# Patient Record
Sex: MALE | Race: OTHER
[De-identification: names, ages, dates, MRNs, and addresses within clinical notes are randomized per-mention and may not be internally consistent; named-entity substitution may affect disease eponyms.]

---

## 2018-02-06 ENCOUNTER — HOSPITAL ENCOUNTER (EMERGENCY)
Dept: HOSPITAL 72 - EMR | Age: 83
Discharge: LEFT BEFORE DISCHARGE | End: 2018-02-06
Payer: MEDICARE

## 2018-02-06 VITALS — DIASTOLIC BLOOD PRESSURE: 65 MMHG | SYSTOLIC BLOOD PRESSURE: 122 MMHG

## 2018-02-06 VITALS — WEIGHT: 170 LBS | BODY MASS INDEX: 25.76 KG/M2 | HEIGHT: 68 IN

## 2018-02-06 VITALS — DIASTOLIC BLOOD PRESSURE: 77 MMHG | SYSTOLIC BLOOD PRESSURE: 119 MMHG

## 2018-02-06 DIAGNOSIS — I25.10: ICD-10-CM

## 2018-02-06 DIAGNOSIS — R13.10: ICD-10-CM

## 2018-02-06 DIAGNOSIS — I10: ICD-10-CM

## 2018-02-06 DIAGNOSIS — E78.00: ICD-10-CM

## 2018-02-06 DIAGNOSIS — K21.9: ICD-10-CM

## 2018-02-06 DIAGNOSIS — R07.9: Primary | ICD-10-CM

## 2018-02-06 LAB
ADD MANUAL DIFF: NO
ALBUMIN SERPL-MCNC: 3.3 G/DL (ref 3.4–5)
ALBUMIN/GLOB SERPL: 0.9 {RATIO} (ref 1–2.7)
ALP SERPL-CCNC: 118 U/L (ref 46–116)
ALT SERPL-CCNC: 52 U/L (ref 12–78)
ANION GAP SERPL CALC-SCNC: 7 MMOL/L (ref 5–15)
APTT BLD: 28 SEC (ref 23–33)
AST SERPL-CCNC: 21 U/L (ref 15–37)
BASOPHILS NFR BLD AUTO: 0.8 % (ref 0–2)
BILIRUB SERPL-MCNC: 0.9 MG/DL (ref 0.2–1)
BUN SERPL-MCNC: 21 MG/DL (ref 7–18)
CALCIUM SERPL-MCNC: 9.3 MG/DL (ref 8.5–10.1)
CHLORIDE SERPL-SCNC: 104 MMOL/L (ref 98–107)
CK MB SERPL-MCNC: 1.1 NG/ML (ref 0–3.6)
CK SERPL-CCNC: 49 U/L (ref 26–308)
CO2 SERPL-SCNC: 31 MMOL/L (ref 21–32)
CREAT SERPL-MCNC: 1 MG/DL (ref 0.55–1.3)
EOSINOPHIL NFR BLD AUTO: 2.3 % (ref 0–3)
ERYTHROCYTE [DISTWIDTH] IN BLOOD BY AUTOMATED COUNT: 12.7 % (ref 11.6–14.8)
GLOBULIN SER-MCNC: 3.7 G/DL
HCT VFR BLD CALC: 46.1 % (ref 42–52)
HGB BLD-MCNC: 15.7 G/DL (ref 14.2–18)
INR PPP: 1 (ref 0.9–1.1)
LYMPHOCYTES NFR BLD AUTO: 30.9 % (ref 20–45)
MCV RBC AUTO: 90 FL (ref 80–99)
MONOCYTES NFR BLD AUTO: 10.3 % (ref 1–10)
NEUTROPHILS NFR BLD AUTO: 55.7 % (ref 45–75)
PLATELET # BLD: 184 K/UL (ref 150–450)
POTASSIUM SERPL-SCNC: 4.3 MMOL/L (ref 3.5–5.1)
RBC # BLD AUTO: 5.14 M/UL (ref 4.7–6.1)
SODIUM SERPL-SCNC: 142 MMOL/L (ref 136–145)
WBC # BLD AUTO: 5.9 K/UL (ref 4.8–10.8)

## 2018-02-06 PROCEDURE — 36415 COLL VENOUS BLD VENIPUNCTURE: CPT

## 2018-02-06 PROCEDURE — 85730 THROMBOPLASTIN TIME PARTIAL: CPT

## 2018-02-06 PROCEDURE — 84484 ASSAY OF TROPONIN QUANT: CPT

## 2018-02-06 PROCEDURE — 71045 X-RAY EXAM CHEST 1 VIEW: CPT

## 2018-02-06 PROCEDURE — 82550 ASSAY OF CK (CPK): CPT

## 2018-02-06 PROCEDURE — 85610 PROTHROMBIN TIME: CPT

## 2018-02-06 PROCEDURE — 93005 ELECTROCARDIOGRAM TRACING: CPT

## 2018-02-06 PROCEDURE — 99285 EMERGENCY DEPT VISIT HI MDM: CPT

## 2018-02-06 PROCEDURE — 82553 CREATINE MB FRACTION: CPT

## 2018-02-06 PROCEDURE — 80053 COMPREHEN METABOLIC PANEL: CPT

## 2018-02-06 PROCEDURE — 83880 ASSAY OF NATRIURETIC PEPTIDE: CPT

## 2018-02-06 PROCEDURE — 85025 COMPLETE CBC W/AUTO DIFF WBC: CPT

## 2018-02-06 NOTE — DISCHARGE SUMMARY
Discharge Summary


Hospital Course


Date of Admission


Feb 6, 2018 at 14:45


Date of Discharge





Admitting Diagnosis


DYSPHAGIA, CP


HPI


Federico Senior is a 85 year old male who was admitted on Feb 6, 2018 at 14:

45 for Dysphagia,Chest Pain


Hospital Course


Patient came in to ED for complaints of Chest pain and vomiting x1. He has PMH 

significant


for CAD, HTN, HLD, choledocholithiasis s/p ERCP sept 2017 at [facility].  He 

presented with 


one day duration of chest pain, no radiation, non-exertional pain, initial work-

up at ED showed


negative troponin and EKG sinus with no changes.  He was planned for admission 

to telemetry


and will undergo cardiac work-up with cardiology. Orders were given and patient


was examined at ED, however, while waiting to be transferred, daughter came to 


ED and patient left AMA.





Final Diagnosis:


1. Chest pain, possible ACS, incomplete work-up done 


2. Gastritis/GERD


3. Hyperlipidemia


4. Hypertension


5. Vomiting





Above findings and plans were discussed with Dr Mancini.





Discharge


Discharge Disposition


Patient left AMA


Discharge Diagnoses:  











Yany Huddleston NP Feb 6, 2018 18:14

## 2018-02-06 NOTE — EMERGENCY ROOM REPORT
History of Present Illness


General


Chief Complaint:  Chest Pain


Source:  Patient, Medical Record





Present Illness


HPI


This patient is brought in by EMS.  He states that he was eating an orange 

today and was having difficulty swallowing it and it became stuck.  He states 

that then he vomiting and developed cp.  He states that he has had other 

similar incidents with pills.  He denies shortness of breath.  He denies cough 

or congestion.  He denies fever or chills.  He denies recent illness.  He 

states that his chest pain has now resolved.  He has no other complaints.


Allergies:  


Coded Allergies:  


     No Known Allergies (Unverified , 2/6/18)





Patient History


Past Medical History:  see triage record, HTN, MI, CAD, other - HLP


Social History:  Denies: smoking, alcohol use, drug use


Reviewed Nursing Documentation:  PMH: Agreed, PSxH: Agreed





Nursing Documentation-PMH


Hx Cardiac Problems:  Yes - CAD; High Cholesterol


Hx Hypertension:  Yes


Hx Gastrointestinal Problems:  Yes - GERD





Review of Systems


All Other Systems:  negative except mentioned in HPI





Physical Exam





Vital Signs








  Date Time  Temp Pulse Resp B/P (MAP) Pulse Ox O2 Delivery O2 Flow Rate FiO2


 


2/6/18 13:05 95.5 58 16 104/69 99 Room Air  








Sp02 EP Interpretation:  reviewed, normal


General Appearance:  no apparent distress, alert, GCS 15, non-toxic


Head:  normocephalic, atraumatic


Eyes:  bilateral eye normal inspection, bilateral eye PERRL


ENT:  hearing grossly normal, normal pharynx, no angioedema, normal voice


Neck:  full range of motion, supple/symm/no masses


Respiratory:  chest non-tender, lungs clear, normal breath sounds, speaking 

full sentences


Cardiovascular #1:  regular rate, rhythm, no edema, systolic murmur


Gastrointestinal:  normal bowel sounds, non tender, soft, non-distended, no 

guarding, no rebound


Rectal:  deferred


Musculoskeletal:  back normal, gait/station normal, normal range of motion, non-

tender


Neurologic:  alert, oriented x3, responsive, motor strength/tone normal, 

sensory intact, speech normal


Psychiatric:  judgement/insight normal, memory normal, mood/affect normal, no 

suicidal/homicidal ideation


Skin:  normal color, no rash, warm/dry, well hydrated





Medical Decision Making


Diagnostic Impression:  


 Primary Impression:  


 Chest pain


 Additional Impression:  


 Dysphagia


ER Course


This elderly male presents with chest pain after the dysphasia incident.  This 

could be related to acid reflux and vomiting.  However, the patient has a 

history of coronary artery disease, hypertension and hyperlipidemia in his high-

risk for acute coronary syndrome.  This could have been a stressor that caused 

angina.  Another consideration is esophagitis.  Patient also has had some 

dysphasia previously and will need undergo evaluation by gastroenterology.  He 

could have esophageal cancer other esophageal abnormality.  He was admitted for 

further evaluation by cardiology and gastroenterology.





Laboratory Tests








Test


  2/6/18


14:24


 


White Blood Count


  5.9 K/UL


(4.8-10.8)


 


Red Blood Count


  5.14 M/UL


(4.70-6.10)


 


Hemoglobin


  15.7 G/DL


(14.2-18.0)


 


Hematocrit


  46.1 %


(42.0-52.0)


 


Mean Corpuscular Volume 90 FL (80-99)  


 


Mean Corpuscular Hemoglobin


  30.5 PG


(27.0-31.0)


 


Mean Corpuscular Hemoglobin


Concent 34.0 G/DL


(32.0-36.0)


 


Red Cell Distribution Width


  12.7 %


(11.6-14.8)


 


Platelet Count


  184 K/UL


(150-450)


 


Mean Platelet Volume


  9.7 FL


(6.5-10.1)


 


Neutrophils (%) (Auto)


  55.7 %


(45.0-75.0)


 


Lymphocytes (%) (Auto)


  30.9 %


(20.0-45.0)


 


Monocytes (%) (Auto)


  10.3 %


(1.0-10.0)  H


 


Eosinophils (%) (Auto)


  2.3 %


(0.0-3.0)


 


Basophils (%) (Auto)


  0.8 %


(0.0-2.0)


 


Prothrombin Time


  10.0 SEC


(9.30-11.50)


 


Prothrombin Time INR 1.0 (0.9-1.1)  


 


PTT


  28 SEC (23-33)


 


 


Sodium Level


  142 MMOL/L


(136-145)


 


Potassium Level


  4.3 MMOL/L


(3.5-5.1)


 


Chloride Level


  104 MMOL/L


()


 


Carbon Dioxide Level


  31 MMOL/L


(21-32)


 


Anion Gap


  7 mmol/L


(5-15)


 


Blood Urea Nitrogen


  21 mg/dL


(7-18)  H


 


Creatinine


  1.0 MG/DL


(0.55-1.30)


 


Estimate Glomerular


Filtration Rate  mL/min (>60)  


 


 


Glucose Level


  100 MG/DL


()


 


Calcium Level


  9.3 MG/DL


(8.5-10.1)


 


Total Bilirubin Pending  


 


Aspartate Amino Transferase


(AST) Pending  


 


 


Alanine Aminotransferase (ALT) Pending  


 


Alkaline Phosphatase Pending  


 


Total Creatine Kinase Pending  


 


Creatine Kinase MB Pending  


 


Troponin I


  0.000 ng/mL


(0.000-0.056)


 


Pro-B-Type Natriuretic Peptide Pending  


 


Total Protein Pending  


 


Albumin Pending  


 


Globulin Pending  








EKG Diagnostic Results


Rate:  normal


Rhythm:  NSR


ST Segments:  no acute changes


Other Impression


NSST





Rhythm Strip Diag. Results


EP Interpretation:  yes


Rate:  80's


Rhythm:  NSR, no PVC's, no ectopy





Chest X-Ray Diagnostic Results


Chest X-Ray Diagnostic Results :  


   Chest X-Ray Ordered:  Yes


   # of Views/Limited/Complete:  1 View


   Indication:  Chest Pain


   EP Interpretation:  Yes


   Interpretation:  no consolidation, no effusion, no pneumothorax, no acute 

cardiopulmonary disease


   Impression:  No acute disease


   Electronically Signed by:  Roula





Last Vital Signs








  Date Time  Temp Pulse Resp B/P (MAP) Pulse Ox O2 Delivery O2 Flow Rate FiO2


 


2/6/18 13:23 96.1 58 16 122/65 99 Room Air  








Disposition:  ADMITTED AS INPATIENT


Condition:  Stable


Referrals:  


Kenroy Mancini MD (PCP)











RENE APODACA D.O. Feb 6, 2018 16:09

## 2018-02-06 NOTE — DIAGNOSTIC IMAGING REPORT
Indication: Chest pain

 

Technique: One view of the chest

 

Comparison: none

 

Findings: No acute infiltrates, effusions, or congestion. Tortuous calcified aorta.

Normal heart size. Upper mediastinum unremarkable. There is minimal atelectasis at

the left lung base

 

Impression: No acute process.

## 2018-02-06 NOTE — HISTORY & PHYSICAL
History and Physical


History & Physicial


[id number]











Yany Huddleston NP Feb 6, 2018 17:29

## 2018-02-07 NOTE — CARDIOLOGY REPORT
--------------- APPROVED REPORT --------------





EKG Measurement

Heart [id number]

NM [id number]

[id number]

[id number]

[id number]





Sinus bradycardia with 1st degree AV block

Cannot rule out Anterior infarct, age undetermined

Abnormal ECG

## 2018-02-07 NOTE — HX AND PHYL REPO 2 SIG
DATE OF ADMISSION:  02/06/2018



HISTORY OF PRESENT ILLNESS:  The patient is a delightful 85 year-old [place] 
male, who

was at the adult  [place] complained of chest pain and had vomiting

x1, presented to ED for further evaluation.  He has past medical history

significant for coronary artery disease, high blood pressure,

hyperlipidemia, GERD, and choledocholithiasis status post ERCP done five

months ago at [facility].  According to the patient, he was feeling

fine.  Earlier today, he developed chest discomfort, chest pain with no

radiation.  The patient then presented to ED for further evaluation.  He

also had an episode of vomiting x1, which is nonbilious and nonbloody.

Vomitus was from previously digested food.  He was evaluated at

emergency room, initial troponin was negative and EKG was unremarkable. However
, due

to the patient's risk factors, the patient will be admitted to telemetry for 
workup for

possible acute coronary syndrome.



PAST MEDICAL HISTORY:  Significant for coronary artery disease, GERD,

choledocholithiasis status post ERCP, hyperlipidemia, hypertension,

occasional dysphagia.



MEDICATIONS:  The patient unable to recall the list of home medications.

We will try to secure medication list from the patient's daughter.



ALLERGY:  No known allergy.



CODE STATUS:  Full Code.



SOCIAL HISTORY:  Denies smoking or alcohol use.  The patient lives at

home. 

REVIEW OF SYSTEMS:  Denies fever or chills.  Denies eye pain or blurry

vision.  Denies ear pain or nasal discharge, but has occasional cough.

Denies shortness of breath.  He has constipation and one episode of

vomiting.  Denies dysuria, frequency or urgency.  Denies weakness or

numbness.  Denies easy bleeding or easy bruising.



PHYSICAL EXAMINATION:

CONSTITUTIONAL:  The patient is well-developed and well-nourished male not

in distress.

VITAL SIGNS:  On arrival to ED, blood pressure is 104/69, respiratory rate

16, pulse 58 and 99% oxygen saturation on room air.

HEENT:  Head is normocephalic and atraumatic.

NECK:  Supple.

CARDIOVASCULAR:  S1 and S2.  Regular rate and rhythm.

RESPIRATORY:  Lungs are clear with no use of accessory muscles of

respiration.  No rales.  No crackles.

ABDOMEN:  Slightly distended, but soft.  Not firm.  Positive normoactive

bowel sounds.

GENITOURINARY:  Deferred.

EXTREMITIES:  No clubbing.  No cyanosis with slight bilateral

edema.

NEUROLOGIC:  The patient is awake, alert, and oriented. Cranial nerves II

through XII grossly intact.



LABORATORY AND DIAGNOSTIC DATA:  WBC 5.9, hemoglobin 15, hematocrit 46 and

platelet is 184.  Sodium is 142, potassium 4.3, BUN 21, and creatinine

1.0.  AST 21, ALT 52, and alkaline phosphatase 118.  Initial troponin was

negative.  INR 1.0.  Chest x-ray done showed no acute process.  EKG done

at the ER was in normal sinus rhythm with no ST to T-wave changes per ER

physician report.



ASSESSMENT:

1. Chest pain, possible acute coronary syndrome.

2. Gastroesophageal reflux disease/gastritis.

3. Coronary artery disease.

4. Hyperlipidemia.



PLAN:  The patient will be admitted to telemetry for cardiac monitoring and

cardiac workup.  We will monitor EKG and serial troponins.  Continue the

patient on Plavix.  Check thyroid panel and fasting lipid.

Continue with home medications.  Dr. Guthrie, Cardiac consult and Dr. Fair, GI consult.  No further episodes of vomiting noted.  We will

start the patient on diet tonight.                                             
                                                                               
              Above findings and plan were discussed

with Dr. Mancini who agrees with the plan of care.







  ______________________________________________

  Kenroy Mancini M.D.





  ______________________________________________

  Yany Huddleston N.P.





DR:  BLUE

D:  02/06/2018 17:28

T:  02/07/2018 03:40

JOB#:  [id number]

CC:



TERRI